# Patient Record
Sex: FEMALE | Race: WHITE | Employment: PART TIME | URBAN - METROPOLITAN AREA
[De-identification: names, ages, dates, MRNs, and addresses within clinical notes are randomized per-mention and may not be internally consistent; named-entity substitution may affect disease eponyms.]

---

## 2018-02-15 ENCOUNTER — HOSPITAL ENCOUNTER (OUTPATIENT)
Age: 33
Discharge: HOME | End: 2018-02-15
Payer: COMMERCIAL

## 2018-02-22 ENCOUNTER — HOSPITAL ENCOUNTER (OUTPATIENT)
Age: 33
Discharge: HOME | End: 2018-02-22
Payer: COMMERCIAL

## 2018-02-22 PROCEDURE — 86003 ALLG SPEC IGE CRUDE XTRC EA: CPT

## 2018-02-22 PROCEDURE — 86008 ALLG SPEC IGE RECOMB EA: CPT

## 2018-02-26 LAB
MISC #3 REFERENCE REPORT: NORMAL
MISC REFERENCE: NORMAL
MISC. #1 REFERENCE GROUP TEST: NORMAL
MISC. #2 REFERENCE REPORT: NORMAL

## 2018-03-23 ENCOUNTER — HOSPITAL ENCOUNTER (OUTPATIENT)
Age: 33
Discharge: HOME | End: 2018-03-23

## 2018-04-23 ENCOUNTER — HOSPITAL ENCOUNTER (EMERGENCY)
Age: 33
Discharge: HOME | End: 2018-04-23
Payer: COMMERCIAL

## 2018-04-23 ENCOUNTER — HOSPITAL ENCOUNTER (EMERGENCY)
Dept: GENERAL RADIOLOGY | Age: 33
Discharge: HOME | End: 2018-04-23
Payer: COMMERCIAL

## 2018-04-23 VITALS
OXYGEN SATURATION: 100 % | SYSTOLIC BLOOD PRESSURE: 114 MMHG | WEIGHT: 150 LBS | DIASTOLIC BLOOD PRESSURE: 71 MMHG | RESPIRATION RATE: 16 BRPM | TEMPERATURE: 98.7 F | HEART RATE: 93 BPM | BODY MASS INDEX: 27.44 KG/M2

## 2018-04-23 DIAGNOSIS — S62.619A: Primary | ICD-10-CM

## 2018-04-23 PROCEDURE — 99214 OFFICE O/P EST MOD 30 MIN: CPT | Performed by: NURSE PRACTITIONER

## 2018-04-23 PROCEDURE — 29130 APPL FINGER SPLINT STATIC: CPT | Performed by: NURSE PRACTITIONER

## 2018-04-23 PROCEDURE — 73130 X-RAY EXAM OF HAND: CPT

## 2018-04-23 PROCEDURE — 99215 OFFICE O/P EST HI 40 MIN: CPT

## 2018-04-23 PROCEDURE — 29130 APPL FINGER SPLINT STATIC: CPT

## 2018-04-23 RX ORDER — MONTELUKAST SODIUM 10 MG/1
10 TABLET ORAL DAILY
COMMUNITY

## 2018-04-23 RX ORDER — HYDROCODONE BITARTRATE AND ACETAMINOPHEN 5; 325 MG/1; MG/1
1 TABLET ORAL EVERY 6 HOURS PRN
Qty: 12 TABLET | Refills: 0 | Status: SHIPPED | OUTPATIENT
Start: 2018-04-23 | End: 2018-04-26

## 2018-04-23 ASSESSMENT — PAIN SCALES - GENERAL: PAINLEVEL_OUTOF10: 1

## 2018-04-23 ASSESSMENT — PAIN DESCRIPTION - LOCATION: LOCATION: FINGER (COMMENT WHICH ONE)

## 2018-04-23 ASSESSMENT — PAIN DESCRIPTION - DESCRIPTORS: DESCRIPTORS: ACHING

## 2018-04-23 ASSESSMENT — PAIN DESCRIPTION - PAIN TYPE: TYPE: ACUTE PAIN

## 2018-04-23 ASSESSMENT — ENCOUNTER SYMPTOMS: COLOR CHANGE: 1

## 2018-04-23 ASSESSMENT — PAIN DESCRIPTION - ORIENTATION: ORIENTATION: RIGHT

## 2018-05-22 ENCOUNTER — HOSPITAL ENCOUNTER (OUTPATIENT)
Age: 33
Discharge: HOME | End: 2018-05-22

## 2018-06-19 ENCOUNTER — HOSPITAL ENCOUNTER (OUTPATIENT)
Age: 33
Discharge: HOME | End: 2018-06-19

## 2018-06-19 ENCOUNTER — HOSPITAL ENCOUNTER (OUTPATIENT)
Dept: GENERAL RADIOLOGY | Age: 33
Discharge: HOME | End: 2018-06-19

## 2018-06-19 DIAGNOSIS — S19.9XXA INJURY OF NECK, INITIAL ENCOUNTER: ICD-10-CM

## 2018-08-21 ENCOUNTER — HOSPITAL ENCOUNTER (OUTPATIENT)
Age: 33
Discharge: HOME | End: 2018-08-21

## 2018-08-21 LAB — HEPATITIS C ANTIBODY: NEGATIVE

## 2018-08-24 LAB — HIV-2 AB: NEGATIVE

## 2019-04-18 ENCOUNTER — HOSPITAL ENCOUNTER (EMERGENCY)
Age: 34
Discharge: HOME | End: 2019-04-18
Payer: COMMERCIAL

## 2019-04-18 VITALS
HEART RATE: 114 BPM | SYSTOLIC BLOOD PRESSURE: 117 MMHG | TEMPERATURE: 98.6 F | BODY MASS INDEX: 29.26 KG/M2 | RESPIRATION RATE: 16 BRPM | DIASTOLIC BLOOD PRESSURE: 65 MMHG | WEIGHT: 159 LBS | HEIGHT: 62 IN | OXYGEN SATURATION: 98 %

## 2019-04-18 DIAGNOSIS — J10.1 INFLUENZA B: Primary | ICD-10-CM

## 2019-04-18 LAB
FLU A ANTIGEN: NEGATIVE
GROUP A STREP CULTURE, REFLEX: NEGATIVE
INFLUENZA B AG, EIA: POSITIVE
REFLEX THROAT C + S: NORMAL

## 2019-04-18 PROCEDURE — 87804 INFLUENZA ASSAY W/OPTIC: CPT

## 2019-04-18 PROCEDURE — 99213 OFFICE O/P EST LOW 20 MIN: CPT

## 2019-04-18 PROCEDURE — 87880 STREP A ASSAY W/OPTIC: CPT

## 2019-04-18 PROCEDURE — 87070 CULTURE OTHR SPECIMN AEROBIC: CPT

## 2019-04-18 PROCEDURE — 99213 OFFICE O/P EST LOW 20 MIN: CPT | Performed by: NURSE PRACTITIONER

## 2019-04-18 RX ORDER — IBUPROFEN 800 MG/1
800 TABLET ORAL EVERY 8 HOURS PRN
Qty: 21 TABLET | Refills: 0 | Status: SHIPPED | OUTPATIENT
Start: 2019-04-18 | End: 2022-04-25

## 2019-04-18 RX ORDER — DEXTROMETHORPHAN HYDROBROMIDE AND PROMETHAZINE HYDROCHLORIDE 15; 6.25 MG/5ML; MG/5ML
5 SYRUP ORAL 4 TIMES DAILY PRN
Qty: 120 ML | Refills: 0 | Status: SHIPPED | OUTPATIENT
Start: 2019-04-18 | End: 2019-04-25

## 2019-04-18 RX ORDER — OSELTAMIVIR PHOSPHATE 75 MG/1
75 CAPSULE ORAL 2 TIMES DAILY
Qty: 10 CAPSULE | Refills: 0 | Status: SHIPPED | OUTPATIENT
Start: 2019-04-18 | End: 2019-04-23

## 2019-04-18 ASSESSMENT — ENCOUNTER SYMPTOMS
SINUS PAIN: 0
SORE THROAT: 1
SINUS PRESSURE: 0
TROUBLE SWALLOWING: 0
RHINORRHEA: 1
NAUSEA: 0
VOMITING: 0
DIARRHEA: 0
COLOR CHANGE: 0
COUGH: 1
SHORTNESS OF BREATH: 0

## 2019-04-18 ASSESSMENT — PAIN DESCRIPTION - PAIN TYPE: TYPE: ACUTE PAIN

## 2019-04-18 ASSESSMENT — PAIN DESCRIPTION - LOCATION: LOCATION: THROAT

## 2019-04-18 ASSESSMENT — PAIN - FUNCTIONAL ASSESSMENT: PAIN_FUNCTIONAL_ASSESSMENT: PREVENTS OR INTERFERES SOME ACTIVE ACTIVITIES AND ADLS

## 2019-04-18 ASSESSMENT — PAIN SCALES - GENERAL: PAINLEVEL_OUTOF10: 5

## 2019-04-18 NOTE — ED TRIAGE NOTES
Patient ambulated to room with complaint of cough, sore throat body ache and fever.  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

## 2019-04-18 NOTE — ED PROVIDER NOTES
[facility]  Urgent Care Encounter       CHIEF COMPLAINT       Chief Complaint   Patient presents with    Cough    Pharyngitis    Fever     102.3 last night       Nurses Notes reviewed and I agree except as noted in the HPI. HISTORY OF PRESENT ILLNESS   Marina Krishnamurthy is a 36 y.o. female who presents to the urgent care for complaints of cough, sore throat, fever. The patient states that her cough started on Monday. Her sore throat started on Tuesday. Her fever started last night. She has been using ibuprofen for her symptoms. 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 She denies nausea, vomiting, diarrhea, chest pain, shortness of breath, wheezing. The patient does have a history of asthma. She states her asthma is well-controlled. HPI    REVIEW OF SYSTEMS     Review of Systems   Constitutional: Positive for activity change, chills, fatigue and fever. Negative for appetite change. HENT: Positive for congestion, postnasal drip, rhinorrhea and sore throat. Negative for sinus pressure, sinus pain and trouble swallowing. Respiratory: Positive for cough. Negative for shortness of breath. Cardiovascular: Negative for chest pain. Gastrointestinal: Negative for diarrhea, nausea and vomiting. Musculoskeletal: Positive for myalgias. Negative for arthralgias. Skin: Negative for color change, pallor, rash and wound. PAST MEDICAL HISTORY         Diagnosis Date    Asthma     Migraines        SURGICALHISTORY     Patient  has a past surgical history that includes Dilation and curettage of uterus; Finger fracture surgery; and Tongue surgery. CURRENT MEDICATIONS       Discharge Medication List as of 4/18/2019  9:20 AM      CONTINUE these medications which have NOT CHANGED    Details   montelukast (SINGULAIR) 10 MG tablet Take 10 mg by mouth dailyHistorical Med      levocetirizine (XYZAL) 5 MG tablet Take 5 mg by mouth nightly.       Mometasone Furo-Formoterol Fum (DULERA IN) Inhale  into the lungs. albuterol (PROVENTIL HFA;VENTOLIN HFA) 108 (90 BASE) MCG/ACT inhaler Inhale 2 puffs into the lungs every 6 hours as needed for Wheezing. fluticasone (FLONASE) 50 MCG/ACT nasal spray 1 spray by Nasal route 2 times daily. NONFORMULARY q nasal spray             ALLERGIES     Patient is has No Known Allergies. Patients   Immunization History   Administered Date(s) Administered    Influenza Virus Vaccine 10/02/2018       FAMILY HISTORY     Patient's family history includes High Cholesterol in her father and mother. SOCIAL HISTORY     Patient  reports that she has never smoked. She has never used smokeless tobacco. She reports that she does not drink alcohol or use drugs. PHYSICAL EXAM     ED TRIAGE VITALS  BP: 117/65, Temp: 98.6 °F (37 °C), Pulse: 114, Resp: 16, SpO2: 98 %,Estimated body mass index is 29.08 kg/m² as calculated from the following:    Height as of this encounter: 5' 2\" (1.575 m). Weight as of this encounter: 159 lb (72.1 kg). ,Patient's last menstrual period was 04/17/2019. Physical Exam   Constitutional: She is oriented to person, place, and time. Vital signs are normal. She appears well-developed and well-nourished. She is active and cooperative. Non-toxic appearance. She does not have a sickly appearance. She appears ill. No distress. HENT:   Head: Normocephalic and atraumatic. Right Ear: Hearing, external ear and ear canal normal. A middle ear effusion is present. Left Ear: Hearing, external ear and ear canal normal. A middle ear effusion is present. Nose: Nose normal.   Mouth/Throat: Uvula is midline and mucous membranes are normal. Posterior oropharyngeal erythema present. No oropharyngeal exudate, posterior oropharyngeal edema or tonsillar abscesses. No tonsillar exudate. Cardiovascular: Normal rate.    Pulmonary/Chest: Effort normal and breath sounds normal.   Neurological: She is alert and oriented to person, place, and time. Skin: Skin is warm and dry. No rash noted. Nursing note and vitals reviewed. DIAGNOSTIC RESULTS     Labs:  Results for orders placed or performed during the hospital encounter of 04/18/19   Rapid influenza A/B antigens   Result Value Ref Range    Flu A Antigen NEGATIVE NEGATIVE    Influenza B Ag, EIA POSITIVE (A) NEGATIVE   STREP A ANTIGEN   Result Value Ref Range    GROUP A STREP CULTURE, REFLEX NEGATIVE        IMAGING:    No orders to display         EKG:      URGENT CARE COURSE:     Vitals:    04/18/19 0838   BP: 117/65   Pulse: 114   Resp: 16   Temp: 98.6 °F (37 °C)   TempSrc: Temporal   SpO2: 98%   Weight: 159 lb (72.1 kg)   Height: 5' 2\" (1.575 m)       Medications - No data to display         PROCEDURES:  None    FINAL IMPRESSION      1. Influenza B          DISPOSITION/ PLAN     The patient's physical exam and laboratory studies are consistent with influenza B. She will be treated with Tamiflu, promethazine DM, ibuprofen. The patient is to take the medication as prescribed/directed. The patient is to follow-up with their PCP in 2-3 days. They are to go to the ER for any worsening symptoms. The patient/caregiver were agreeable to this plan of care and voiced no concerns at time of discharge. The patient was ambulatory out of the department in stable condition. PATIENT REF[facility] TO:  [facility] [address] [place] [id number]      DISCHARGE MEDICATIONS:  Discharge Medication List as of 4/18/2019  9:20 AM      START taking these medications    Details   oseltamivir (TAMIFLU) 75 MG capsule Take 1 capsule by mouth 2 times daily for 5 days, Disp-10 capsule, R-0Normal      promethazine-dextromethorphan (PROMETHAZINE-DM) 6.25-15 MG/5ML syrup Take 5 mLs by mouth 4 times daily as needed for Cough, Disp-120 mL, R-0Normal      ibuprofen (ADVIL;MOTRIN) 800 MG tablet Take 1 tablet by mouth every 8 hours as needed for Pain or Fever, Disp-21 tablet, R-0Normal Discharge Medication List as of 4/18/2019  9:20 AM          Discharge Medication List as of 4/18/2019  9:20 AM          Aris [place], APRN - CNP    (Please note that portions of this note were completed with a voice recognition program. Efforts were made to edit the dictations but occasionally words are mis-transcribed.)           LALIT Monterroso CNP  04/18/19 1012

## 2019-04-18 NOTE — ED NOTES
Pt verbalized discharge instructions. Pt informed to go to ER if develop chest pain, shortness of breath or abdominal pain. Pt ambulatory out in stable condition. Assessment unchanged.        Jordan Cisse RN  04/18/19 [id number]

## 2019-04-21 LAB — THROAT/NOSE CULTURE: NORMAL

## 2020-01-23 ENCOUNTER — OFFICE VISIT (OUTPATIENT)
Dept: FAMILY MEDICINE CLINIC | Age: 35
End: 2020-01-23
Payer: COMMERCIAL

## 2020-01-23 VITALS
SYSTOLIC BLOOD PRESSURE: 122 MMHG | BODY MASS INDEX: 30.69 KG/M2 | OXYGEN SATURATION: 98 % | RESPIRATION RATE: 14 BRPM | HEIGHT: 62 IN | HEART RATE: 96 BPM | WEIGHT: 166.8 LBS | DIASTOLIC BLOOD PRESSURE: 68 MMHG

## 2020-01-23 PROBLEM — L20.9 ATOPIC ECZEMA: Status: ACTIVE | Noted: 2020-01-23

## 2020-01-23 PROBLEM — Z82.79 FAMILY HISTORY OF BICUSPID AORTIC VALVE: Status: ACTIVE | Noted: 2020-01-23

## 2020-01-23 PROBLEM — R20.2 NUMBNESS AND TINGLING IN LEFT HAND: Chronic | Status: ACTIVE | Noted: 2020-01-23

## 2020-01-23 PROBLEM — J45.30 MILD PERSISTENT ASTHMA WITHOUT COMPLICATION: Status: ACTIVE | Noted: 2020-01-23

## 2020-01-23 PROBLEM — R20.0 NUMBNESS AND TINGLING IN LEFT HAND: Chronic | Status: ACTIVE | Noted: 2020-01-23

## 2020-01-23 PROBLEM — G43.109 MIGRAINE WITH AURA AND WITHOUT STATUS MIGRAINOSUS: Status: ACTIVE | Noted: 2020-01-23

## 2020-01-23 LAB
ALBUMIN SERPL-MCNC: 4.7 G/DL (ref 3.5–5.1)
ALP BLD-CCNC: 58 U/L (ref 38–126)
ALT SERPL-CCNC: 8 U/L (ref 11–66)
ANION GAP SERPL CALCULATED.3IONS-SCNC: 14 MEQ/L (ref 8–16)
AST SERPL-CCNC: 15 U/L (ref 5–40)
BASOPHILS # BLD: 0.6 %
BASOPHILS ABSOLUTE: 0 THOU/MM3 (ref 0–0.1)
BILIRUB SERPL-MCNC: 0.7 MG/DL (ref 0.3–1.2)
BUN BLDV-MCNC: 13 MG/DL (ref 7–22)
C-REACTIVE PROTEIN: 0.42 MG/DL (ref 0–1)
CALCIUM SERPL-MCNC: 9.3 MG/DL (ref 8.5–10.5)
CHLORIDE BLD-SCNC: 105 MEQ/L (ref 98–111)
CHOLESTEROL, TOTAL: 191 MG/DL (ref 100–199)
CO2: 24 MEQ/L (ref 23–33)
CREAT SERPL-MCNC: 0.7 MG/DL (ref 0.4–1.2)
EOSINOPHIL # BLD: 0.8 %
EOSINOPHILS ABSOLUTE: 0 THOU/MM3 (ref 0–0.4)
ERYTHROCYTE [DISTWIDTH] IN BLOOD BY AUTOMATED COUNT: 11.7 % (ref 11.5–14.5)
ERYTHROCYTE [DISTWIDTH] IN BLOOD BY AUTOMATED COUNT: 38.7 FL (ref 35–45)
FOLATE: > 20 NG/ML (ref 4.8–24.2)
GFR SERPL CREATININE-BSD FRML MDRD: > 90 ML/MIN/1.73M2
GLUCOSE BLD-MCNC: 96 MG/DL (ref 70–108)
HCT VFR BLD CALC: 41.3 % (ref 37–47)
HDLC SERPL-MCNC: 68 MG/DL
HEMOGLOBIN: 13.4 GM/DL (ref 12–16)
IMMATURE GRANS (ABS): 0.01 THOU/MM3 (ref 0–0.07)
IMMATURE GRANULOCYTES: 0.2 %
LDL CHOLESTEROL CALCULATED: 97 MG/DL
LYMPHOCYTES # BLD: 32.9 %
LYMPHOCYTES ABSOLUTE: 1.7 THOU/MM3 (ref 1–4.8)
MCH RBC QN AUTO: 29.6 PG (ref 26–33)
MCHC RBC AUTO-ENTMCNC: 32.4 GM/DL (ref 32.2–35.5)
MCV RBC AUTO: 91.2 FL (ref 81–99)
MONOCYTES # BLD: 5.6 %
MONOCYTES ABSOLUTE: 0.3 THOU/MM3 (ref 0.4–1.3)
NUCLEATED RED BLOOD CELLS: 0 /100 WBC
PLATELET # BLD: 284 THOU/MM3 (ref 130–400)
PMV BLD AUTO: 9.8 FL (ref 9.4–12.4)
POTASSIUM SERPL-SCNC: 4 MEQ/L (ref 3.5–5.2)
RBC # BLD: 4.53 MILL/MM3 (ref 4.2–5.4)
RHEUMATOID FACTOR: 11 IU/ML (ref 0–13)
SEDIMENTATION RATE, ERYTHROCYTE: 9 MM/HR (ref 0–20)
SEG NEUTROPHILS: 59.9 %
SEGMENTED NEUTROPHILS ABSOLUTE COUNT: 3.1 THOU/MM3 (ref 1.8–7.7)
SODIUM BLD-SCNC: 143 MEQ/L (ref 135–145)
TOTAL PROTEIN: 7.2 G/DL (ref 6.1–8)
TRIGL SERPL-MCNC: 130 MG/DL (ref 0–199)
TSH SERPL DL<=0.05 MIU/L-ACNC: 1.23 UIU/ML (ref 0.4–4.2)
VITAMIN B-12: 549 PG/ML (ref 211–911)
VITAMIN D 25-HYDROXY: 27 NG/ML (ref 30–100)
WBC # BLD: 5.2 THOU/MM3 (ref 4.8–10.8)

## 2020-01-23 PROCEDURE — 36415 COLL VENOUS BLD VENIPUNCTURE: CPT | Performed by: FAMILY MEDICINE

## 2020-01-23 PROCEDURE — 99204 OFFICE O/P NEW MOD 45 MIN: CPT | Performed by: FAMILY MEDICINE

## 2020-01-23 RX ORDER — BUDESONIDE AND FORMOTEROL FUMARATE DIHYDRATE 160; 4.5 UG/1; UG/1
2 AEROSOL RESPIRATORY (INHALATION) 2 TIMES DAILY
COMMUNITY

## 2020-01-23 ASSESSMENT — ENCOUNTER SYMPTOMS
COUGH: 0
ABDOMINAL PAIN: 0
DIARRHEA: 0
CONSTIPATION: 0
VOMITING: 0
NAUSEA: 0
BLOOD IN STOOL: 0
EYE PAIN: 0
SHORTNESS OF BREATH: 0
TROUBLE SWALLOWING: 0

## 2020-01-23 ASSESSMENT — PATIENT HEALTH QUESTIONNAIRE - PHQ9
SUM OF ALL RESPONSES TO PHQ QUESTIONS 1-9: 0
SUM OF ALL RESPONSES TO PHQ9 QUESTIONS 1 & 2: 0
SUM OF ALL RESPONSES TO PHQ QUESTIONS 1-9: 0
2. FEELING DOWN, DEPRESSED OR HOPELESS: 0
1. LITTLE INTEREST OR PLEASURE IN DOING THINGS: 0

## 2020-01-23 NOTE — PROGRESS NOTES
Migraine with aura and without status migrainosus    Mild persistent asthma without complication    Family history of bicuspid aortic valve    Numbness and tingling in left hand    Atopic eczema       The patient is allergic to adhesive tape. Medical History  [address] has a past medical history of Asthma, History of chicken pox, Migraines, and Numbness and tingling in left hand. Past SurgicalHistory  The patient  has a past surgical history that includes Dilation and curettage of uterus; Finger fracture surgery; and Tongue surgery. Family History  This patient's family history includes Anxiety Disorder in her maternal aunt, maternal uncle, and mother; Asthma in her daughter and son; High Cholesterol in her father and mother; Irritable Bowel Syndrome in her son; Other in her maternal grandmother, maternal uncle, mother, son, and son; Parkinsonism in her paternal grandfather; Prostate Cancer in her maternal grandfather; Rheum Arthritis in her sister. Social History  Elly  reports that she has never smoked. She has never used smokeless tobacco. She reports that she does not drink alcohol or use drugs. Medications    Current Outpatient Medications:     Triamcinolone Acetonide (NASACORT ALLERGY 24HR NA), by Nasal route, Disp: , Rfl:     budesonide-formoterol (SYMBICORT) 160-4.5 MCG/ACT AERO, Inhale 2 puffs into the lungs 2 times daily, Disp: , Rfl:     Multiple Vitamin (MULTI-VITAMIN DAILY PO), Take by mouth, Disp: , Rfl:     hydrocortisone 2.5 % cream, Apply topically 2 times daily. , Disp: 1 Tube, Rfl: 2    ibuprofen (ADVIL;MOTRIN) 800 MG tablet, Take 1 tablet by mouth every 8 hours as needed for Pain or Fever, Disp: 21 tablet, Rfl: 0    montelukast (SINGULAIR) 10 MG tablet, Take 10 mg by mouth daily, Disp: , Rfl:     levocetirizine (XYZAL) 5 MG tablet, Take 5 mg by mouth nightly., Disp: , Rfl:     albuterol (PROVENTIL HFA;VENTOLIN HFA) 108 (90 BASE) MCG/ACT inhaler, Inhale 2 puffs into the up labs in 4 weeks. - Rheumatoid Factor; Future  - Cyclic Citrul Peptide Antibody, IgG; Future  - MODESTA Screen With Reflex; Future  - C-Reactive Protein; Future  - Sedimentation Rate; Future      5. Fatigue, unspecified type  Will check labs for fatigue  Benign exam  - TSH with Reflex; Future  - CBC With Auto Differential; Future  - Comprehensive Metabolic Panel; Future  - Lipid Panel; Future  - Vitamin B12 & Folate; Future  - Vitamin D 25 Hydroxy; Future      6. Atopic dermatitis, unspecified type  Not currently flaring   Will give patient steroid cream to keep at home. - hydrocortisone 2.5 % cream; Apply topically 2 times daily. Dispense: 1 Tube; Refill: 2        Return in about 4 weeks (around 2/20/2020).     Orders Placed   Orders Placed This Encounter   Procedures    Rheumatoid Factor     Standing Status:   Future     Number of Occurrences:   1     Standing Expiration Date:   5/91/7000    Cyclic Citrul Peptide Antibody, IgG     Standing Status:   Future     Number of Occurrences:   1     Standing Expiration Date:   1/23/2021    TSH with Reflex     Standing Status:   Future     Number of Occurrences:   1     Standing Expiration Date:   1/23/2021    MODESTA Screen With Reflex     Standing Status:   Future     Number of Occurrences:   1     Standing Expiration Date:   1/23/2021    C-Reactive Protein     Standing Status:   Future     Number of Occurrences:   1     Standing Expiration Date:   1/23/2021    Sedimentation Rate     Standing Status:   Future     Number of Occurrences:   1     Standing Expiration Date:   1/23/2021    CBC With Auto Differential     Standing Status:   Future     Number of Occurrences:   1     Standing Expiration Date:   1/23/2021    Comprehensive Metabolic Panel     Standing Status:   Future     Number of Occurrences:   1     Standing Expiration Date:   1/23/2021    Lipid Panel     Standing Status:   Future     Number of Occurrences:   1     Standing Expiration Date:   1/23/2021

## 2020-01-26 LAB — ANA SCREEN: NORMAL

## 2020-01-27 LAB — CYCLIC CITRULLINATED PEPTIDE ANTIBODY IGG: < 1.5 U/ML

## 2020-02-05 ENCOUNTER — TELEPHONE (OUTPATIENT)
Dept: FAMILY MEDICINE CLINIC | Age: 35
End: 2020-02-05

## 2020-02-12 ENCOUNTER — HOSPITAL ENCOUNTER (OUTPATIENT)
Dept: NON INVASIVE DIAGNOSTICS | Age: 35
Discharge: HOME | End: 2020-02-12
Payer: COMMERCIAL

## 2020-02-12 LAB
LV EF: 60 %
LVEF MODALITY: NORMAL

## 2020-02-12 PROCEDURE — 93306 TTE W/DOPPLER COMPLETE: CPT

## 2020-02-27 ENCOUNTER — OFFICE VISIT (OUTPATIENT)
Dept: FAMILY MEDICINE CLINIC | Age: 35
End: 2020-02-27
Payer: COMMERCIAL

## 2020-02-27 ENCOUNTER — TELEPHONE (OUTPATIENT)
Dept: FAMILY MEDICINE CLINIC | Age: 35
End: 2020-02-27

## 2020-02-27 VITALS
HEIGHT: 62 IN | RESPIRATION RATE: 16 BRPM | HEART RATE: 86 BPM | WEIGHT: 166 LBS | BODY MASS INDEX: 30.55 KG/M2 | OXYGEN SATURATION: 99 % | DIASTOLIC BLOOD PRESSURE: 68 MMHG | SYSTOLIC BLOOD PRESSURE: 106 MMHG

## 2020-02-27 PROCEDURE — 99214 OFFICE O/P EST MOD 30 MIN: CPT | Performed by: FAMILY MEDICINE

## 2020-02-27 RX ORDER — SUMATRIPTAN 50 MG/1
50 TABLET, FILM COATED ORAL DAILY PRN
Qty: 18 TABLET | Refills: 1 | Status: SHIPPED | OUTPATIENT
Start: 2020-02-27 | End: 2020-04-27 | Stop reason: SDUPTHER

## 2020-02-27 ASSESSMENT — ENCOUNTER SYMPTOMS
SHORTNESS OF BREATH: 0
BLOOD IN STOOL: 0
COUGH: 0
TROUBLE SWALLOWING: 0
ABDOMINAL PAIN: 0
NAUSEA: 0
DIARRHEA: 0
EYE PAIN: 0
CONSTIPATION: 0
VOMITING: 0

## 2020-02-27 NOTE — PROGRESS NOTES
screening. Menstrual periods are mostly regular, may shift a few days each month. . 3 vaginal deliveries. 2 D&C. 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 Has a partner, 3 kids ages, 15, 15, and 6. Diet- generally healthy. No exercise plan currently. Last cervical cancer screen was around 3 years ago. Follows with Ob-GYN of [facility]. Has had LEEP procedure in the past but states that all Paps since then have been normal.     Patient Active Problem List   Diagnosis    Migraine with aura and without status migrainosus    Mild persistent asthma without complication    Family history of bicuspid aortic valve    Numbness and tingling in left hand    Atopic eczema       The patient is allergic to adhesive tape. Medical History  Julito Onofre has a past medical history of Asthma, History of chicken pox, Migraines, and Numbness and tingling in left hand. Past SurgicalHistory  The patient  has a past surgical history that includes Dilation and curettage of uterus; Finger fracture surgery; and Tongue surgery. Family History  This patient's family history includes Anxiety Disorder in her maternal aunt, maternal uncle, and mother; Asthma in her daughter and son; High Cholesterol in her father and mother; Irritable Bowel Syndrome in her son; Other in her maternal grandmother, maternal uncle, mother, son, and son; Parkinsonism in her paternal grandfather; Prostate Cancer in her maternal grandfather; Rheum Arthritis in her sister. Social History  Elly  reports that she has never smoked. She has never used smokeless tobacco. She reports that she does not drink alcohol or use drugs. Medications    Current Outpatient Medications:     SUMAtriptan (IMITREX) 50 MG tablet, Take 1 tablet by mouth daily as needed for Migraine May repeat in 2 hours if needed. Max dose 200 mg in 24 hours. , Disp: 18 tablet, Rfl: 1    Triamcinolone Acetonide (NASACORT 105 01/23/2020    CREATININE 0.7 01/23/2020    BUN 13 01/23/2020    CO2 24 01/23/2020    TSH 1.230 01/23/2020       Elizabeth Specter:   1. Migraine with aura and without status migrainosus, not intractable  Stable overall but bothersome. Will trial imitrex. If needing frequently would add back topamax. Aware of need to D/C if pregnancy.    - SUMAtriptan (IMITREX) 50 MG tablet; Take 1 tablet by mouth daily as needed for Migraine May repeat in 2 hours if needed. Max dose 200 mg in 24 hours. Dispense: 18 tablet; Refill: 1    2. Family history of bicuspid aortic valve  Unremarkable echo. No action needed. Indications for prompt return if symptomatic reviewed in detail. 3. Mild persistent asthma without complication  Stable. 4. Hand pain, right  Mild; normal labs. Suspect OA. Continue NSAID and tylenol prn.      5. Fatigue, unspecified type  Mild. Suspect shift work as cause. Normal labs and exam. Will follow clinically. Return in about 2 months (around 4/27/2020) for Follow-up chronic conditions. Orders Placed   No orders of the defined types were placed in this encounter. Prescriptions given/sent  Orders Placed This Encounter   Medications    SUMAtriptan (IMITREX) 50 MG tablet     Sig: Take 1 tablet by mouth daily as needed for Migraine May repeat in 2 hours if needed. Max dose 200 mg in 24 hours. Dispense:  18 tablet     Refill:  1       Patient given educational materials - see patient instructions. Discussed use, benefit, and side effects of prescribed medications. All patientquestions answered. Pt voiced understanding. Reviewed health maintenance.               Electronically signed by Leida López MD on 2/27/2020 at 1:44 PM

## 2020-03-30 ENCOUNTER — TELEPHONE (OUTPATIENT)
Dept: FAMILY MEDICINE CLINIC | Age: 35
End: 2020-03-30

## 2020-04-14 ENCOUNTER — TELEPHONE (OUTPATIENT)
Dept: FAMILY MEDICINE CLINIC | Age: 35
End: 2020-04-14

## 2020-04-27 ENCOUNTER — VIRTUAL VISIT (OUTPATIENT)
Dept: FAMILY MEDICINE CLINIC | Age: 35
End: 2020-04-27
Payer: COMMERCIAL

## 2020-04-27 PROCEDURE — 99213 OFFICE O/P EST LOW 20 MIN: CPT | Performed by: FAMILY MEDICINE

## 2020-04-27 RX ORDER — SUMATRIPTAN 50 MG/1
50 TABLET, FILM COATED ORAL DAILY PRN
Qty: 18 TABLET | Refills: 1 | Status: SHIPPED | OUTPATIENT
Start: 2020-04-27 | End: 2020-10-26 | Stop reason: SDUPTHER

## 2020-04-27 ASSESSMENT — ENCOUNTER SYMPTOMS
EYE DISCHARGE: 0
VOMITING: 0
SHORTNESS OF BREATH: 0
BACK PAIN: 0
NAUSEA: 0
CONSTIPATION: 0
SORE THROAT: 0
DIARRHEA: 0
ABDOMINAL PAIN: 0
COUGH: 0
EYE REDNESS: 0

## 2020-04-27 NOTE — PATIENT INSTRUCTIONS
Patient Education        Migraine Headache: Care Instructions  Your Care Instructions  Migraines are painful, throbbing headaches that often start on one side of the head. They may cause nausea and vomiting and make you sensitive to light, sound, or smell. Without treatment, migraines can last from 4 hours to a few days. Medicines can help prevent migraines or stop them after they have started. Your doctor can help you find which ones work best for you. Follow-up care is a key part of your treatment and safety. Be sure to make and go to all appointments, and call your doctor if you are having problems. It's also a good idea to know your test results and keep a list of the medicines you take. How can you care for yourself at home? · Do not drive if you have taken a prescription pain medicine. · Rest in a quiet, dark room until your headache is gone. Close your eyes, and try to relax or go to sleep. Don't watch TV or read. · Put a cold, moist cloth or cold pack on the painful area for 10 to 20 minutes at a time. Put a thin cloth between the cold pack and your skin. · Use a warm, moist towel or a heating pad set on low to relax tight shoulder and neck muscles. · Have someone gently massage your neck and shoulders. · Take your medicines exactly as prescribed. Call your doctor if you think you are having a problem with your medicine. You will get more details on the specific medicines your doctor prescribes. · Be careful not to take pain medicine more often than the instructions allow. You could get worse or more frequent headaches when the medicine wears off. To prevent migraines  · Keep a headache diary so you can figure out what triggers your headaches. Avoiding triggers may help you prevent headaches. Record when each headache began, how long it lasted, and what the pain was like.  (Was it throbbing, aching, stabbing, or dull?) Write down any other symptoms you had with the headache, such as nausea, flashing lights or dark spots, or sensitivity to bright light or loud noise. Note if the headache occurred near your period. List anything that might have triggered the headache. Triggers may include certain foods (chocolate, cheese, wine) or odors, smoke, bright light, stress, or lack of sleep. · If your doctor has prescribed medicine for your migraines, take it as directed. You may have medicine that you take only when you get a migraine and medicine that you take all the time to help prevent migraines. ? If your doctor has prescribed medicine for when you get a headache, take it at the first sign of a migraine, unless your doctor has given you other instructions. ? If your doctor has prescribed medicine to prevent migraines, take it exactly as prescribed. Call your doctor if you think you are having a problem with your medicine. · Find healthy ways to deal with stress. Migraines are most common during or right after stressful times. Take time to relax before and after you do something that has caused a migraine in the past.  · Try to keep your muscles relaxed by keeping good posture. Check your jaw, face, neck, and shoulder muscles for tension. Try to relax them. When you sit at a desk, change positions often. And make sure to stretch for 30 seconds each hour. · Get plenty of sleep and exercise. · Eat meals on a regular schedule. Avoid foods and drinks that often trigger migraines. These include chocolate, alcohol (especially red wine and port), aspartame, monosodium glutamate (MSG), and some additives found in foods (such as hot dogs, clarke, cold cuts, aged cheeses, and pickled foods). · Limit caffeine. Don't drink too much coffee, tea, or soda. But don't quit caffeine suddenly. That can also give you migraines. · Do not smoke or allow others to smoke around you. If you need help quitting, talk to your doctor about stop-smoking programs and medicines.  These can increase your chances of quitting for

## 2020-04-27 NOTE — PROGRESS NOTES
in her 25s. Only has to use albuterol if she has a cold or if it is really cold outside, otherwise does not have to use it. Pretty well controlled.      Has migraines a couple of times a month. Has been having these for over 20 years. Saw Dr. Janie Kohli, several years ago and had full workup. Currently doing well on prn Imitrex. Had been on Topamax in the past which helped her. Does not recognize triggers. Starts with headache with aura. States that she sees alberto occasionally before having headache. Most of the time she feels like she is dealing with them pretty well. On occasion takes ibuprofen or tylenol for these headaches, with some relief.      Menstrual periods are mostly regular, may shift a few days each month. . 3 vaginal deliveries. 2 D&C.      Has a partner, 3 kids.     Diet- generally healthy. No exercise plan currently.      Last cervical cancer screen was around 3 years ago. [facility]. Has had LEEP procedure in the past but states that all Paps since then have been normal.   Patient Active Problem List   Diagnosis    Migraine with aura and without status migrainosus    Mild persistent asthma without complication    Family history of bicuspid aortic valve    Numbness and tingling in left hand    Atopic eczema       The patient is allergic to adhesive tape. Medical History  Keira Mcintyre has a past medical history of Asthma, History of chicken pox, Migraines, and Numbness and tingling in left hand. Past SurgicalHistory  The patient  has a past surgical history that includes Dilation and curettage of uterus; Finger fracture surgery; and Tongue surgery. Family History  This patient's family history includes Anxiety Disorder in her maternal aunt, maternal uncle, and mother; Asthma in her daughter and son; High Cholesterol in her father and mother; Irritable Bowel Syndrome in her son;  Other in her maternal grandmother, maternal uncle, mother, son, and son; Parkinsonism in her paternal grandfather; Prostate Cancer in her maternal grandfather; Rheum Arthritis in her sister. Social History  Elly  reports that she has never smoked. She has never used smokeless tobacco. She reports that she does not drink alcohol or use drugs. Medications    Current Outpatient Medications:     SUMAtriptan (IMITREX) 50 MG tablet, Take 1 tablet by mouth daily as needed for Migraine May repeat in 2 hours if needed. Max dose 200 mg in 24 hours. , Disp: 18 tablet, Rfl: 1    Triamcinolone Acetonide (NASACORT ALLERGY 24HR NA), by Nasal route, Disp: , Rfl:     budesonide-formoterol (SYMBICORT) 160-4.5 MCG/ACT AERO, Inhale 2 puffs into the lungs 2 times daily, Disp: , Rfl:     Multiple Vitamin (MULTI-VITAMIN DAILY PO), Take by mouth, Disp: , Rfl:     hydrocortisone 2.5 % cream, Apply topically 2 times daily. , Disp: 1 Tube, Rfl: 2    ibuprofen (ADVIL;MOTRIN) 800 MG tablet, Take 1 tablet by mouth every 8 hours as needed for Pain or Fever, Disp: 21 tablet, Rfl: 0    montelukast (SINGULAIR) 10 MG tablet, Take 10 mg by mouth daily, Disp: , Rfl:     levocetirizine (XYZAL) 5 MG tablet, Take 5 mg by mouth nightly., Disp: , Rfl:     albuterol (PROVENTIL HFA;VENTOLIN HFA) 108 (90 BASE) MCG/ACT inhaler, Inhale 2 puffs into the lungs every 6 hours as needed for Wheezing., Disp: , Rfl:     Subjective:      Review of Systems   Constitutional: Negative for chills, fatigue, fever and unexpected weight change. HENT: Negative for congestion, ear discharge, ear pain, hearing loss and sore throat. Eyes: Negative for discharge and redness. Respiratory: Negative for cough and shortness of breath. Cardiovascular: Negative for chest pain and palpitations. Gastrointestinal: Negative for abdominal pain, constipation, diarrhea, nausea and vomiting. Genitourinary: Negative for difficulty urinating and dysuria. Musculoskeletal: Negative for arthralgias, back pain, gait problem and neck pain.    Skin: Negative for

## 2020-10-26 ENCOUNTER — VIRTUAL VISIT (OUTPATIENT)
Dept: FAMILY MEDICINE CLINIC | Age: 35
End: 2020-10-26
Payer: COMMERCIAL

## 2020-10-26 PROCEDURE — 99213 OFFICE O/P EST LOW 20 MIN: CPT | Performed by: FAMILY MEDICINE

## 2020-10-26 RX ORDER — SUMATRIPTAN 50 MG/1
50 TABLET, FILM COATED ORAL DAILY PRN
Qty: 18 TABLET | Refills: 3 | Status: SHIPPED | OUTPATIENT
Start: 2020-10-26 | End: 2021-04-15 | Stop reason: SDUPTHER

## 2020-10-26 ASSESSMENT — ENCOUNTER SYMPTOMS
DIARRHEA: 0
SHORTNESS OF BREATH: 0
BLURRED VISION: 1
SORE THROAT: 0
VISUAL CHANGE: 1
COUGH: 0
CONSTIPATION: 0
NAUSEA: 1
EYE PAIN: 0
SINUS PRESSURE: 0
ABDOMINAL PAIN: 0

## 2020-10-26 NOTE — PROGRESS NOTES
[id number] [facility] [address] [address]  Dept: [phone]  Dept Fax: [phone]  Loc: [phone]      Soraida Vigil is a 30 y.o. female who presents today for:  Chief Complaint   Patient presents with    1 Month Follow-Up       Goals    None         HPI:     Migraine    This is a chronic problem. The current episode started more than 1 year ago. The problem occurs monthly. The problem has been unchanged. The pain is located in the left unilateral region. The pain does not radiate. The pain quality is similar to prior headaches. The quality of the pain is described as pulsating. The pain is at a severity of 7/10. The pain is moderate. Associated symptoms include blurred vision, nausea and a visual change. Pertinent negatives include no abdominal pain, coughing, eye pain, fever, hearing loss, seizures, sinus pressure, sore throat, tingling or tinnitus. The symptoms are aggravated by unknown. She has tried darkened room, NSAIDs, triptans, beta blockers and Excedrin for the symptoms. The treatment provided significant relief. There is no history of cluster headaches or pseudotumor cerebri. Patient seen today for followup on migraines. Patient says she is taking imitrix 2-3 times a month and takes 1-2 doses. She says her migraines are currently well controlled since seeing us 6 months ago. There was discussion about restarting topamax but she does not think she needs it and is happy with current regimen. No red flag symptoms such as changes in quality/quanity, fever, weakness, neurological changes, or immunosuppresion.       Past Medical History:   Diagnosis Date    Asthma     History of chicken pox     Migraines     Numbness and tingling in left hand     Due to prior injury      Past Surgical History:   Procedure Laterality Date    DILATION AND CURETTAGE OF UTERUS      x2    FINGER FRACTURE SURGERY      TONGUE SURGERY       Family History   Problem Relation Age of Onset    High Cholesterol Mother     Other Mother         cardiomyopathy and atrial septic defect.  Anxiety Disorder Mother     High Cholesterol Father     Rheum Arthritis Sister     Anxiety Disorder Maternal Aunt     Other Maternal Uncle         chrons    Anxiety Disorder Maternal Uncle     Other Maternal Grandmother         bicuspid aortic valve replacement     Prostate Cancer Maternal Grandfather     Parkinsonism Paternal Grandfather     Irritable Bowel Syndrome Son    Honorio Cortez Other Son         bicuspid aortic valve and aortic insufficiency.  Asthma Son     Asthma Daughter     Other Son         autism     Social History     Tobacco Use    Smoking status: Never Smoker    Smokeless tobacco: Never Used   Substance Use Topics    Alcohol use: No      Current Outpatient Medications   Medication Sig Dispense Refill    SUMAtriptan (IMITREX) 50 MG tablet Take 1 tablet by mouth daily as needed for Migraine May repeat in 2 hours if needed. Max dose 200 mg in 24 hours. 18 tablet 3    Triamcinolone Acetonide (NASACORT ALLERGY 24HR NA) by Nasal route      budesonide-formoterol (SYMBICORT) 160-4.5 MCG/ACT AERO Inhale 2 puffs into the lungs 2 times daily      Multiple Vitamin (MULTI-VITAMIN DAILY PO) Take by mouth      hydrocortisone 2.5 % cream Apply topically 2 times daily. 1 Tube 2    ibuprofen (ADVIL;MOTRIN) 800 MG tablet Take 1 tablet by mouth every 8 hours as needed for Pain or Fever 21 tablet 0    montelukast (SINGULAIR) 10 MG tablet Take 10 mg by mouth daily      levocetirizine (XYZAL) 5 MG tablet Take 5 mg by mouth nightly.  albuterol (PROVENTIL HFA;VENTOLIN HFA) 108 (90 BASE) MCG/ACT inhaler Inhale 2 puffs into the lungs every 6 hours as needed for Wheezing. No current facility-administered medications for this visit.       Allergies   Allergen Reactions    Adhesive Tape Swelling and Rash       Health Maintenance   Topic Date Due    Cervical cancer screen  09/28/2019    DTaP/Tdap/Td vaccine (2 - Td) 03/12/2028    Flu vaccine  Completed    Pneumococcal 0-64 years Vaccine  Completed    HIV screen  Completed    Hepatitis A vaccine  Aged Out    Hepatitis B vaccine  Aged Out    Hib vaccine  Aged Out    Meningococcal (ACWY) vaccine  Aged Out    Varicella vaccine  Discontinued       Subjective:      Review of Systems   Constitutional: Negative for appetite change, fever and unexpected weight change. HENT: Negative for congestion, hearing loss, sinus pressure, sore throat and tinnitus. Eyes: Positive for blurred vision. Negative for pain and visual disturbance. Respiratory: Negative for cough and shortness of breath. Cardiovascular: Negative for chest pain and leg swelling. Gastrointestinal: Positive for nausea. Negative for abdominal pain, constipation and diarrhea. Genitourinary: Negative for difficulty urinating, dysuria, hematuria, menstrual problem, pelvic pain, vaginal bleeding, vaginal discharge and vaginal pain. Musculoskeletal: Negative for arthralgias and myalgias. Neurological: Negative for tingling and seizures. Psychiatric/Behavioral: Negative for behavioral problems and sleep disturbance. Objective: There were no vitals filed for this visit. There is no height or weight on file to calculate BMI. Wt Readings from Last 3 Encounters:   02/27/20 166 lb (75.3 kg)   01/23/20 166 lb 12.8 oz (75.7 kg)   04/18/19 159 lb (72.1 kg)     BP Readings from Last 3 Encounters:   02/27/20 106/68   01/23/20 122/68   04/18/19 117/65       Physical Exam  Constitutional:       General: She is not in acute distress. Appearance: Normal appearance. She is not ill-appearing. HENT:      Head: Normocephalic and atraumatic. Right Ear: External ear normal.      Left Ear: External ear normal.      Nose: Nose normal. No congestion or rhinorrhea.       Mouth/Throat:      Mouth: Mucous membranes are moist. Pharynx: Oropharynx is clear. Eyes:      General: No scleral icterus. Right eye: No discharge. Left eye: No discharge. Conjunctiva/sclera: Conjunctivae normal.      Pupils: Pupils are equal, round, and reactive to light. Neck:      Musculoskeletal: Normal range of motion and neck supple. Pulmonary:      Effort: Pulmonary effort is normal. No respiratory distress. Musculoskeletal: Normal range of motion. General: No swelling, deformity or signs of injury. Skin:     General: Skin is warm and dry. Findings: No erythema or rash. Neurological:      General: No focal deficit present. Mental Status: She is alert and oriented to person, place, and time. Mental status is at baseline. Cranial Nerves: No cranial nerve deficit. Psychiatric:         Mood and Affect: Mood normal.         Behavior: Behavior normal.         Judgment: Judgment normal.         Lab Results   Component Value Date    WBC 5.2 01/23/2020    HGB 13.4 01/23/2020    HCT 41.3 01/23/2020     01/23/2020    CHOL 191 01/23/2020    TRIG 130 01/23/2020    HDL 68 01/23/2020    LDLCALC 97 01/23/2020    AST 15 01/23/2020     01/23/2020    K 4.0 01/23/2020     01/23/2020    CREATININE 0.7 01/23/2020    BUN 13 01/23/2020    CO2 24 01/23/2020    TSH 1.230 01/23/2020    LABGLOM >90 01/23/2020    CALCIUM 9.3 01/23/2020    VITD25 27 (L) 01/23/2020       Imaging Results:    No results found. Assessment/Plan:     Pete Sapp was seen today for 1 month follow-up. Diagnoses and all orders for this visit:    Migraine with aura and without status migrainosus, not intractable  -     SUMAtriptan (IMITREX) 50 MG tablet; Take 1 tablet by mouth daily as needed for Migraine May repeat in 2 hours if needed. Max dose 200 mg in 24 hours. Will continue imitrix and defer topamax at this time as migraines currently well controlled. Will followup in 6 months and reassess.              Return in about 6 months (around 4/26/2021) for re-check headaches. Medications Prescribed:  Orders Placed This Encounter   Medications    SUMAtriptan (IMITREX) 50 MG tablet     Sig: Take 1 tablet by mouth daily as needed for Migraine May repeat in 2 hours if needed. Max dose 200 mg in 24 hours. Dispense:  18 tablet     Refill:  3       Future Appointments   Date Time Provider Gala Casandra   4/12/2021 12:30 PM Isela Loo MD [address] [facility] [address]       Patient given educational materials - see patient instructions. Discussed use, benefit, and sideeffects of prescribed medications. All patient questions answered. Pt voiced understanding. Reviewed health maintenance. Instructed to continue current medications, diet and exercise. Patient agreed with treatment plan. Follow up as directed. Attending attestation:  I personally performed and participated key or critical portions of the evaluation and management including personally performing the exam and medical decision making. I verify the accuracy of the documentation by the resident with the following addition or changes: Patient is stable. Headaches are stable with Imitrex use  mg only a couple times a month. Not currently planning pregnancy. Other issues stable.         Electronically signed by Isela Loo MD on 10/26/2020 at 5:08 PM      Electronically signed by Isela Loo MD on 10/26/2020 at 5:08 PM

## 2021-04-15 ENCOUNTER — VIRTUAL VISIT (OUTPATIENT)
Dept: FAMILY MEDICINE CLINIC | Age: 36
End: 2021-04-15
Payer: COMMERCIAL

## 2021-04-15 DIAGNOSIS — G43.109 MIGRAINE WITH AURA AND WITHOUT STATUS MIGRAINOSUS, NOT INTRACTABLE: ICD-10-CM

## 2021-04-15 PROCEDURE — 99213 OFFICE O/P EST LOW 20 MIN: CPT | Performed by: FAMILY MEDICINE

## 2021-04-15 RX ORDER — SUMATRIPTAN 50 MG/1
50 TABLET, FILM COATED ORAL DAILY PRN
Qty: 18 TABLET | Refills: 3 | Status: SHIPPED | OUTPATIENT
Start: 2021-04-15 | End: 2021-10-11 | Stop reason: SDUPTHER

## 2021-04-15 ASSESSMENT — ENCOUNTER SYMPTOMS
NAUSEA: 0
VISUAL CHANGE: 0
RHINORRHEA: 0
EYE PAIN: 0
BACK PAIN: 0
COUGH: 0
BLURRED VISION: 0
VOMITING: 0
PHOTOPHOBIA: 0
SORE THROAT: 0
EYE WATERING: 0
EYE REDNESS: 0

## 2021-04-15 ASSESSMENT — PATIENT HEALTH QUESTIONNAIRE - PHQ9
SUM OF ALL RESPONSES TO PHQ QUESTIONS 1-9: 0
2. FEELING DOWN, DEPRESSED OR HOPELESS: 0
SUM OF ALL RESPONSES TO PHQ9 QUESTIONS 1 & 2: 0

## 2021-04-15 NOTE — PROGRESS NOTES
Holly Cuevas (:  1985) is a 29 y.o. female,Established patient, here for evaluation of the following chief complaint(s): Other (f/u migraines)      ASSESSMENT/PLAN:  1. Migraine with aura and without status migrainosus, not intractable  Patient's migraines are well controlled on sumatriptan. Patient would like to stay on her current medication. We will continue sumatriptan 50 mg as needed for migraines. Patient will follow up in 6 months to reassess her migraines. - SUMAtriptan (IMITREX) 50 MG tablet; Take 1 tablet by mouth daily as needed for Migraine May repeat in 2 hours if needed. Max dose 200 mg in 24 hours. Dispense: 18 tablet; Refill: 3    Return in about 6 months (around 10/15/2021) for f/u migraines. SUBJECTIVE/OBJECTIVE:  Migraine   This is a chronic problem. The problem occurs intermittently. The problem has been gradually improving. The pain is at a severity of 0/10. The patient is experiencing no pain. Pertinent negatives include no back pain, blurred vision, coughing, eye pain, eye redness, eye watering, hearing loss, loss of balance, nausea, numbness, phonophobia, photophobia, rhinorrhea, sore throat, tingling, visual change or vomiting. She has tried triptans for the symptoms. The treatment provided significant relief. Her past medical history is significant for migraine headaches. She is using Imitrex 2-3 times per month, for 1-2 doses. Imitrex is working well for her. The last time she had a migraine was . The migraine lasted for less than one day. She reports no side effects from Imitrex. She is happy with current medications, and does not want to start topamax. Review of Systems   HENT: Negative for hearing loss, rhinorrhea and sore throat. Eyes: Negative for blurred vision, photophobia, pain and redness. Respiratory: Negative for cough. Gastrointestinal: Negative for nausea and vomiting. Musculoskeletal: Negative for back pain.    Neurological:

## 2021-10-11 ENCOUNTER — VIRTUAL VISIT (OUTPATIENT)
Dept: FAMILY MEDICINE CLINIC | Age: 36
End: 2021-10-11
Payer: COMMERCIAL

## 2021-10-11 DIAGNOSIS — G43.109 MIGRAINE WITH AURA AND WITHOUT STATUS MIGRAINOSUS, NOT INTRACTABLE: ICD-10-CM

## 2021-10-11 PROCEDURE — 99213 OFFICE O/P EST LOW 20 MIN: CPT | Performed by: FAMILY MEDICINE

## 2021-10-11 RX ORDER — SUMATRIPTAN 50 MG/1
50 TABLET, FILM COATED ORAL DAILY PRN
Qty: 18 TABLET | Refills: 3 | Status: SHIPPED | OUTPATIENT
Start: 2021-10-11 | End: 2022-04-25 | Stop reason: SDUPTHER

## 2021-10-11 NOTE — PROGRESS NOTES
[address] [address]  Dept: [phone]  Dept Fax: [phone]  Loc: [phone]      Seun Hdez is a 29 y.o. female who presents todayfor her medical conditions/complaints as noted below. Seun Hdez is c/o of No chief complaint on file.      :     HPI     Here for follow-up on migraines. On imitrex. Doing well. Typically worse in Spring. Uses imitrex maybe once a week. Only every other week this month. Usually feels better 1 hour after a dose. No desire to add topamax at this time. No changes desired. Has appointment in November for pap smear. Asthma controlled. Sees allergy immunology for this. Already got flu shot at employee clinic this year. Patient Active Problem List   Diagnosis    Migraine with aura and without status migrainosus    Mild persistent asthma without complication    Family history of bicuspid aortic valve    Numbness and tingling in left hand    Atopic eczema     Goals    None       The patient is allergic to adhesive tape. Medical History  Clarissa Oneal has a past medical history of Asthma, History of chicken pox, Migraines, and Numbness and tingling in left hand. Past SurgicalHistory  The patient  has a past surgical history that includes Dilation and curettage of uterus; Finger fracture surgery; and Tongue surgery. Family History  This patient's family history includes Anxiety Disorder in her maternal aunt, maternal uncle, and mother; Asthma in her daughter and son; High Cholesterol in her father and mother; Irritable Bowel Syndrome in her son; Other in her maternal grandmother, maternal uncle, mother, son, and son; Parkinsonism in her paternal grandfather; Prostate Cancer in her maternal grandfather; Rheum Arthritis in her sister. Social History  Elly  reports that she has never smoked.  She has never used smokeless tobacco. She reports that she does not drink alcohol and does not use drugs. Medications    Current Outpatient Medications:     SUMAtriptan (IMITREX) 50 MG tablet, Take 1 tablet by mouth daily as needed for Migraine May repeat in 2 hours if needed. Max dose 200 mg in 24 hours. , Disp: 18 tablet, Rfl: 3    Triamcinolone Acetonide (NASACORT ALLERGY 24HR NA), by Nasal route, Disp: , Rfl:     budesonide-formoterol (SYMBICORT) 160-4.5 MCG/ACT AERO, Inhale 2 puffs into the lungs 2 times daily, Disp: , Rfl:     Multiple Vitamin (MULTI-VITAMIN DAILY PO), Take by mouth, Disp: , Rfl:     hydrocortisone 2.5 % cream, Apply topically 2 times daily. , Disp: 1 Tube, Rfl: 2    ibuprofen (ADVIL;MOTRIN) 800 MG tablet, Take 1 tablet by mouth every 8 hours as needed for Pain or Fever, Disp: 21 tablet, Rfl: 0    montelukast (SINGULAIR) 10 MG tablet, Take 10 mg by mouth daily, Disp: , Rfl:     levocetirizine (XYZAL) 5 MG tablet, Take 5 mg by mouth nightly., Disp: , Rfl:     albuterol (PROVENTIL HFA;VENTOLIN HFA) 108 (90 BASE) MCG/ACT inhaler, Inhale 2 puffs into the lungs every 6 hours as needed for Wheezing., Disp: , Rfl:     Subjective:      Review of Systems     Occasional headache. Feels well otherwise. No other symptoms reported. No red flags. Breathing stable on medications. Allergies stable. Objective: There were no vitals filed for this visit. Physical Exam  Constitutional:       General: She is not in acute distress. Appearance: Normal appearance. She is not ill-appearing or toxic-appearing. HENT:      Head: Normocephalic and atraumatic. Nose: Nose normal.      Mouth/Throat:      Mouth: Mucous membranes are moist.   Eyes:      Conjunctiva/sclera: Conjunctivae normal.      Pupils: Pupils are equal, round, and reactive to light. Pulmonary:      Effort: Pulmonary effort is normal. No respiratory distress. Skin:     General: Skin is dry. Findings: No rash. Neurological:      Mental Status: She is alert. Psychiatric:         Mood and Affect: Mood normal.         Behavior: Behavior normal.         Thought Content: Thought content normal.         Lab Results   Component Value Date    WBC 5.2 01/23/2020    HGB 13.4 01/23/2020    HCT 41.3 01/23/2020     01/23/2020    CHOL 191 01/23/2020    TRIG 130 01/23/2020    HDL 68 01/23/2020    ALT 8 (L) 01/23/2020    AST 15 01/23/2020     01/23/2020    K 4.0 01/23/2020     01/23/2020    CREATININE 0.7 01/23/2020    BUN 13 01/23/2020    CO2 24 01/23/2020    TSH 1.230 01/23/2020       Garcia Loosen:   1. Migraine with aura and without status migrainosus, not intractable  Stable on imitrex. Refilled. - SUMAtriptan (IMITREX) 50 MG tablet; Take 1 tablet by mouth daily as needed for Migraine May repeat in 2 hours if needed. Max dose 200 mg in 24 hours. Dispense: 18 tablet; Refill: 3    Other issues followed by specialists. Return in about 6 months (around 4/11/2022) for re-check migraines. Orders Placed   No orders of the defined types were placed in this encounter. Prescriptions given/sent  Orders Placed This Encounter   Medications    SUMAtriptan (IMITREX) 50 MG tablet     Sig: Take 1 tablet by mouth daily as needed for Migraine May repeat in 2 hours if needed. Max dose 200 mg in 24 hours. Dispense:  18 tablet     Refill:  3       Patient given educational materials - see patient instructions. Discussed use, benefit, and side effects of recommended medications. All patient questions answered. Pt voiced understanding. Reviewed health maintenance.           Electronically signed by Jenni Edgar MD on 10/11/2021 at 11:45 AM

## 2021-11-02 ENCOUNTER — NURSE ONLY (OUTPATIENT)
Dept: LAB | Age: 36
End: 2021-11-02

## 2021-11-10 LAB — CYTOLOGY THIN PREP PAP: NORMAL

## 2022-01-10 ENCOUNTER — NURSE ONLY (OUTPATIENT)
Dept: LAB | Age: 37
End: 2022-01-10

## 2022-03-15 ENCOUNTER — APPOINTMENT (OUTPATIENT)
Dept: GENERAL RADIOLOGY | Age: 37
End: 2022-03-15
Payer: COMMERCIAL

## 2022-03-15 ENCOUNTER — HOSPITAL ENCOUNTER (EMERGENCY)
Age: 37
Discharge: HOME | End: 2022-03-15
Payer: COMMERCIAL

## 2022-03-15 VITALS
BODY MASS INDEX: 29.44 KG/M2 | TEMPERATURE: 98.6 F | HEART RATE: 106 BPM | SYSTOLIC BLOOD PRESSURE: 118 MMHG | RESPIRATION RATE: 14 BRPM | DIASTOLIC BLOOD PRESSURE: 74 MMHG | HEIGHT: 62 IN | WEIGHT: 160 LBS | OXYGEN SATURATION: 100 %

## 2022-03-15 DIAGNOSIS — S99.922A TOE INJURY, LEFT, INITIAL ENCOUNTER: Primary | ICD-10-CM

## 2022-03-15 PROCEDURE — 73660 X-RAY EXAM OF TOE(S): CPT

## 2022-03-15 PROCEDURE — 99213 OFFICE O/P EST LOW 20 MIN: CPT | Performed by: NURSE PRACTITIONER

## 2022-03-15 PROCEDURE — 99213 OFFICE O/P EST LOW 20 MIN: CPT

## 2022-03-15 ASSESSMENT — PAIN DESCRIPTION - DESCRIPTORS: DESCRIPTORS: THROBBING

## 2022-03-15 ASSESSMENT — PAIN DESCRIPTION - ORIENTATION: ORIENTATION: LEFT

## 2022-03-15 ASSESSMENT — PAIN DESCRIPTION - LOCATION: LOCATION: TOE (COMMENT WHICH ONE)

## 2022-03-15 ASSESSMENT — PAIN SCALES - GENERAL: PAINLEVEL_OUTOF10: 4

## 2022-03-15 ASSESSMENT — PAIN DESCRIPTION - FREQUENCY: FREQUENCY: CONTINUOUS

## 2022-03-15 ASSESSMENT — PAIN DESCRIPTION - PAIN TYPE: TYPE: ACUTE PAIN

## 2022-03-15 NOTE — ED PROVIDER NOTES
[facility]       CHIEF COMPLAINT       Chief Complaint   Patient presents with    Toe Injury     left great toe injury a week ago dropped heavy metal water bottle on it        Nurses Notes reviewed and I agree except as noted in the HPI. HISTORY OF PRESENT ILLNESS   Leno Hinton is a 40 y.o. female who presents with complaints of left great toe pain for the past week after she dropped a heavy metal water bottle. She states she continues have moderate amount of pain. There is no radiation of pain or numbness or tingling. Pain worsens with palpation or movement. She does have range of motion in her toe with discomfort. She states she has been icing and taking ibuprofen the bruising has resolved. However, the pain persist.    The history is provided by the patient. REVIEW OF SYSTEMS     Review of Systems   Constitutional: Negative for activity change. Cardiovascular: Negative for leg swelling. Musculoskeletal: Positive for arthralgias, joint swelling and myalgias. Negative for gait problem. Skin: Positive for wound. Neurological: Negative for weakness and numbness. PAST MEDICAL HISTORY         Diagnosis Date    Asthma     History of chicken pox     Low grade squamous intraepithelial lesion on cytologic smear of cervix (LGSIL)     Migraines     Numbness and tingling in left hand     Due to prior injury       SURGICALHISTORY     Patient  has a past surgical history that includes Dilation and curettage of uterus; Finger fracture surgery; and Tongue surgery. CURRENT MEDICATIONS       Previous Medications    ALBUTEROL (PROVENTIL HFA;VENTOLIN HFA) 108 (90 BASE) MCG/ACT INHALER    Inhale 2 puffs into the lungs every 6 hours as needed for Wheezing. BUDESONIDE-FORMOTEROL (SYMBICORT) 160-4.5 MCG/ACT AERO    Inhale 2 puffs into the lungs 2 times daily    HYDROCORTISONE 2.5 % CREAM    Apply topically 2 times daily.     IBUPROFEN (ADVIL;MOTRIN) menstrual period was 03/07/2022. Physical Exam  Vitals and nursing note reviewed. Constitutional:       General: She is not in acute distress. Cardiovascular:      Rate and Rhythm: Normal rate. Pulses: Normal pulses. Pulmonary:      Effort: Pulmonary effort is normal.   Musculoskeletal:         General: Swelling (left great toe), tenderness and signs of injury present. No deformity. Skin:     General: Skin is warm and dry. Findings: Bruising (left great toe) present. Neurological:      Mental Status: She is alert and oriented to person, place, and time. DIAGNOSTIC RESULTS     Labs:No results found for this visit on 03/15/22. IMAGING:    XR TOE LEFT (MIN 2 VIEWS)   Final Result   Negative left toe            **This report has been created using voice recognition software. It may contain minor errors which are inherent in voice recognition technology. **      Final report electronically signed by Dr. Humza Stewart on 3/15/2022 1:27 PM          I have independently reviewed the radiology images as well as the radiologist's report and have discussed them with the patient. EKG:  None    URGENT CARE COURSE:     Vitals:    03/15/22 1259   BP: 118/74   Pulse: 106   Resp: 14   Temp: 98.6 °F (37 °C)   TempSrc: Temporal   SpO2: 100%   Weight: 160 lb (72.6 kg)   Height: 5' 2\" (1.575 m)       Medications - No data to display       PROCEDURES:  None    FINAL IMPRESSION      1. Toe injury, left, initial encounter      DISPOSITION/ PLAN   DISPOSITION Decision To Discharge 03/15/2022 01:33:43 PM     X-ray of the left toe negative for acute fracture. Toe injury consistent with a contusion. Recommend continued over-the-counter antipyretics for discomfort, resting, icing, compressing and elevating as necessary. Follow-up with PCP if does not improve in 10 days. Patient voiced understanding was agreeable to above-mentioned plan. Patient was discharged in stable condition.     PATIENT REFERRED TO:  Claudeen Lone, MD [address] / [place] [id number]      DISCHARGE MEDICATIONS:  New Prescriptions    No medications on file       Discontinued Medications    No medications on file       Current Discharge Medication List          LALIT Vincent CNP    (Please note that portions of this note were completed with a voice recognition program. Efforts were made to edit the dictations but occasionally words are mis-transcribed.)            LALIT Vincent CNP  03/15/22 0412

## 2022-03-15 NOTE — ED TRIAGE NOTES
Pt ambulatory into esuc with c/o left great toe pain. Pt states a week ago she dropped a full metal bottle of water on it. Pt states pain has continued with a pain of 4. Small red area noted. Pt states bruising has gone away.

## 2022-04-25 ENCOUNTER — TELEMEDICINE (OUTPATIENT)
Dept: FAMILY MEDICINE CLINIC | Age: 37
End: 2022-04-25
Payer: COMMERCIAL

## 2022-04-25 DIAGNOSIS — R87.612 LOW GRADE SQUAMOUS INTRAEPITHELIAL LESION ON CYTOLOGIC SMEAR OF CERVIX (LGSIL): ICD-10-CM

## 2022-04-25 DIAGNOSIS — G43.109 MIGRAINE WITH AURA AND WITHOUT STATUS MIGRAINOSUS, NOT INTRACTABLE: ICD-10-CM

## 2022-04-25 DIAGNOSIS — J45.30 MILD PERSISTENT ASTHMA WITHOUT COMPLICATION: ICD-10-CM

## 2022-04-25 DIAGNOSIS — Z00.00 WELL WOMAN EXAM (NO GYNECOLOGICAL EXAM): Primary | ICD-10-CM

## 2022-04-25 PROBLEM — G43.909 MIGRAINES: Status: ACTIVE | Noted: 2020-01-23

## 2022-04-25 PROBLEM — J45.909 ASTHMA: Status: ACTIVE | Noted: 2022-04-25

## 2022-04-25 PROCEDURE — 99395 PREV VISIT EST AGE 18-39: CPT | Performed by: FAMILY MEDICINE

## 2022-04-25 RX ORDER — SUMATRIPTAN 50 MG/1
50 TABLET, FILM COATED ORAL DAILY PRN
Qty: 18 TABLET | Refills: 5 | Status: SHIPPED | OUTPATIENT
Start: 2022-04-25

## 2022-04-25 ASSESSMENT — ENCOUNTER SYMPTOMS
SHORTNESS OF BREATH: 0
COUGH: 0
VOMITING: 0
NAUSEA: 0

## 2022-04-25 ASSESSMENT — PATIENT HEALTH QUESTIONNAIRE - PHQ9
SUM OF ALL RESPONSES TO PHQ QUESTIONS 1-9: 0
2. FEELING DOWN, DEPRESSED OR HOPELESS: 0
SUM OF ALL RESPONSES TO PHQ9 QUESTIONS 1 & 2: 0
SUM OF ALL RESPONSES TO PHQ QUESTIONS 1-9: 0
1. LITTLE INTEREST OR PLEASURE IN DOING THINGS: 0
SUM OF ALL RESPONSES TO PHQ QUESTIONS 1-9: 0
SUM OF ALL RESPONSES TO PHQ QUESTIONS 1-9: 0

## 2022-04-25 NOTE — PROGRESS NOTES
[address] [address]  Dept: [phone]  Dept Fax: [phone]  Loc: [phone]      Sofy Sethi is a 40 y.o. female who presents todayfor her medical conditions/complaints as noted below. Sofy Sethi is c/o of [facility]ness Program, Migraine, and Asthma      :     HPI     Here for video follow-up today. Doing well. Headaches controlled. Uses Imitrex maybe once a week. Rarely takes two pills. Breathing is stable with rare use of inhaler. Does not need refills on these medications. Gets these from allergy/immunology. Does report she had colposcopy after abnormal pap smear that was normal.  Repeat is planned in 1 year. No depression. Otherwise feels well. Did get COVID-19 booster; updated chart. Will get pneumococcal vaccine once we have new vaccine in stock. Was in ER for toe pain after dropping something on it. This was not broken and has healed up. Patient Active Problem List   Diagnosis    Migraines    Mild persistent asthma without complication    Family history of bicuspid aortic valve    Numbness and tingling in left hand    Atopic eczema    Low grade squamous intraepithelial lesion on cytologic smear of cervix (LGSIL)    Asthma     Goals    None       The patient is allergic to adhesive tape. Medical History  Raman Farias has a past medical history of Asthma, History of chicken pox, Low grade squamous intraepithelial lesion on cytologic smear of cervix (LGSIL), Migraines, and Numbness and tingling in left hand. Past SurgicalHistory  The patient  has a past surgical history that includes Dilation and curettage of uterus; Finger fracture surgery; and Tongue surgery.     Family History  This patient's family history includes Anxiety Disorder in her maternal aunt, maternal uncle, and mother; Asthma in her daughter and son; High Cholesterol in her father and mother; Irritable Bowel Syndrome in her son; Other in her maternal grandmother, maternal uncle, mother, son, and son; Parkinsonism in her paternal grandfather; Prostate Cancer in her maternal grandfather; Rheum Arthritis in her sister. Social History  Elly  reports that she has never smoked. She has never used smokeless tobacco. She reports that she does not drink alcohol and does not use drugs. Medications    Current Outpatient Medications:     SUMAtriptan (IMITREX) 50 MG tablet, Take 1 tablet by mouth daily as needed for Migraine May repeat in 2 hours if needed. Max dose 200 mg in 24 hours. , Disp: 18 tablet, Rfl: 5    Triamcinolone Acetonide (NASACORT ALLERGY 24HR NA), by Nasal route, Disp: , Rfl:     budesonide-formoterol (SYMBICORT) 160-4.5 MCG/ACT AERO, Inhale 2 puffs into the lungs 2 times daily, Disp: , Rfl:     Multiple Vitamin (MULTI-VITAMIN DAILY PO), Take by mouth, Disp: , Rfl:     montelukast (SINGULAIR) 10 MG tablet, Take 10 mg by mouth daily, Disp: , Rfl:     levocetirizine (XYZAL) 5 MG tablet, Take 5 mg by mouth nightly., Disp: , Rfl:     albuterol (PROVENTIL HFA;VENTOLIN HFA) 108 (90 BASE) MCG/ACT inhaler, Inhale 2 puffs into the lungs every 6 hours as needed for Wheezing., Disp: , Rfl:     Subjective:      Review of Systems   Constitutional: Negative for chills and fever. Eyes: Negative for visual disturbance. Respiratory: Negative for cough and shortness of breath. Cardiovascular: Negative for chest pain. Gastrointestinal: Negative for nausea and vomiting. Skin: Negative for rash. Neurological: Positive for headaches (occasional). Negative for syncope, weakness and numbness. Psychiatric/Behavioral: Negative for decreased concentration and dysphoric mood. The patient is not nervous/anxious. Objective: There were no vitals filed for this visit. Physical Exam  Constitutional:       General: She is not in acute distress. Appearance: Normal appearance.  She is not ill-appearing or toxic-appearing. HENT:      Head: Normocephalic and atraumatic. Nose: Nose normal.      Mouth/Throat:      Mouth: Mucous membranes are moist.   Eyes:      Conjunctiva/sclera: Conjunctivae normal.      Pupils: Pupils are equal, round, and reactive to light. Pulmonary:      Effort: Pulmonary effort is normal. No respiratory distress. Skin:     General: Skin is dry. Findings: No rash. Neurological:      Mental Status: She is alert. Psychiatric:         Mood and Affect: Mood normal.         Behavior: Behavior normal.         Thought Content: Thought content normal.         Lab Results   Component Value Date    WBC 5.2 2020    HGB 13.4 2020    HCT 41.3 2020     2020    CHOL 191 2020    TRIG 130 2020    HDL 68 2020    ALT 8 (L) 2020    AST 15 2020     2020    K 4.0 2020     2020    CREATININE 0.7 2020    BUN 13 2020    CO2 24 2020    TSH 1.230 2020       Yamilka Cashin. Well woman exam (no gynecological exam)  Well check today. 2. Migraine with aura and without status migrainosus, not intractable  Stable. Refilled medication.    - SUMAtriptan (IMITREX) 50 MG tablet; Take 1 tablet by mouth daily as needed for Migraine May repeat in 2 hours if needed. Max dose 200 mg in 24 hours. Dispense: 18 tablet; Refill: 5    3. Mild persistent asthma without complication  Stable. 4. Low grade squamous intraepithelial lesion on cytologic smear of cervix (LGSIL)  Recent negative colposcopy reported. Has follow-up pap planned in 1 year. Return in about 1 year (around 2023) for Annual wellness visit. Orders Placed   No orders of the defined types were placed in this encounter.       Prescriptions given/sent  Orders Placed This Encounter   Medications    SUMAtriptan (IMITREX) 50 MG tablet     Sig: Take 1 tablet by mouth daily as needed for Migraine May repeat in 2 hours if needed. Max dose 200 mg in 24 hours. Dispense:  18 tablet     Refill:  5       Patient given educational materials - see patient instructions. Discussed use, benefit, and side effects of recommended medications. All patient questions answered. Pt voiced understanding. Reviewed health maintenance - will get pneumococcal vaccine in follow-up.           Electronically signed by Aruna Goodman MD on 4/25/2022 at 12:21 PM

## 2023-06-05 ENCOUNTER — NURSE ONLY (OUTPATIENT)
Dept: LAB | Age: 38
End: 2023-06-05

## 2023-06-12 LAB — CYTOLOGY THIN PREP PAP: NORMAL

## 2024-03-12 ENCOUNTER — HOSPITAL ENCOUNTER (EMERGENCY)
Age: 39
Discharge: HOME | End: 2024-03-12
Payer: COMMERCIAL

## 2024-03-12 VITALS
TEMPERATURE: 98.3 F | DIASTOLIC BLOOD PRESSURE: 57 MMHG | OXYGEN SATURATION: 100 % | HEART RATE: 96 BPM | RESPIRATION RATE: 20 BRPM | SYSTOLIC BLOOD PRESSURE: 129 MMHG

## 2024-03-12 DIAGNOSIS — J02.0 STREPTOCOCCAL SORE THROAT: Primary | ICD-10-CM

## 2024-03-12 LAB — S PYO AG THROAT QL: POSITIVE

## 2024-03-12 PROCEDURE — 99213 OFFICE O/P EST LOW 20 MIN: CPT

## 2024-03-12 PROCEDURE — 87651 STREP A DNA AMP PROBE: CPT

## 2024-03-12 RX ORDER — AMOXICILLIN 500 MG/1
500 CAPSULE ORAL 2 TIMES DAILY
Qty: 20 CAPSULE | Refills: 0 | Status: SHIPPED | OUTPATIENT
Start: 2024-03-12 | End: 2024-03-22

## 2024-03-12 ASSESSMENT — ENCOUNTER SYMPTOMS: SORE THROAT: 1

## 2024-03-12 NOTE — DISCHARGE INSTRUCTIONS
Medication as prescribed.   Warm salt water gargles and throat lozenges for sore throat.   Increase water intake, frequent hand washing.  Tylenol / Ibuprofen as needed for fever and or pain.  Follow up with PCP in 3-5 days if no improvement or sooner with worsening symptoms.

## 2024-03-12 NOTE — ED PROVIDER NOTES
instructed to use over-the-counter Tylenol and Motrin for pain or fever.  Instructed to follow-up with their PCP in 3 to 5 days and worsening symptoms.  The patient is agreeable with the above plan and denies questions or concerns at this time.        PATIENT REFERRED TO:  Alyse Skaggs MD  [address] [facility] [address]      DISCHARGE MEDICATIONS:  Discharge Medication List as of 3/12/2024  9:27 AM        START taking these medications    Details   amoxicillin (AMOXIL) 500 MG capsule Take 1 capsule by mouth 2 times daily for 10 days, Disp-20 capsule, R-0Normal             Discharge Medication List as of 3/12/2024  9:27 AM          Discharge Medication List as of 3/12/2024  9:27 AM          LALIT Pérez CNP    (Please note that portions of this note were completed with a voice recognition program. Efforts were made to edit the dictations but occasionally words are mis-transcribed.)            Elsa Painter APRN - CNP  03/12/24 [id number]

## 2025-02-05 ENCOUNTER — HOSPITAL ENCOUNTER (EMERGENCY)
Age: 40
Discharge: HOME | End: 2025-02-05
Payer: COMMERCIAL

## 2025-02-05 VITALS
HEART RATE: 120 BPM | RESPIRATION RATE: 20 BRPM | DIASTOLIC BLOOD PRESSURE: 81 MMHG | OXYGEN SATURATION: 97 % | SYSTOLIC BLOOD PRESSURE: 124 MMHG | TEMPERATURE: 98.6 F

## 2025-02-05 DIAGNOSIS — R68.89 INFLUENZA-LIKE SYMPTOMS: Primary | ICD-10-CM

## 2025-02-05 DIAGNOSIS — Z20.828 EXPOSURE TO INFLUENZA: ICD-10-CM

## 2025-02-05 PROCEDURE — 99213 OFFICE O/P EST LOW 20 MIN: CPT | Performed by: NURSE PRACTITIONER

## 2025-02-05 PROCEDURE — 99213 OFFICE O/P EST LOW 20 MIN: CPT

## 2025-02-05 RX ORDER — OSELTAMIVIR PHOSPHATE 75 MG/1
75 CAPSULE ORAL 2 TIMES DAILY
Qty: 20 CAPSULE | Refills: 0 | Status: SHIPPED | OUTPATIENT
Start: 2025-02-05 | End: 2025-02-15

## 2025-02-05 ASSESSMENT — ENCOUNTER SYMPTOMS
COUGH: 1
FLU SYMPTOMS: 1

## 2025-02-05 NOTE — DISCHARGE INSTRUCTIONS
You will need to follow with your family provider if more then 2 calendar days in a row are needed off for illness from work or if FLMA paperwork is required.

## 2025-02-05 NOTE — ED PROVIDER NOTES
[facility]  UrgentCare Encounter      CHIEFCOMPLAINT       Chief Complaint   Patient presents with    Generalized Body Aches    Cough       Nurses Notes reviewed and I agree except as noted in the HPI.  HISTORY OF PRESENT ILLNESS     Elly Brooks is a 40 y.o. female who presents to the urgent care for evaluation.  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 She is requesting Tamiflu today due to her history of asthma.  The history is provided by the patient.   Influenza  Presenting symptoms: cough and myalgias    Duration:  1 day  Risk factors: sick contacts (influenza)    Risk factors comment:  Asthma      The patient/patient representative has no other acute complaints at this time.    REVIEW OF SYSTEMS     Review of Systems   Respiratory:  Positive for cough.    Musculoskeletal:  Positive for myalgias.   All other systems reviewed and are negative.      PAST MEDICAL HISTORY         Diagnosis Date    Asthma     History of chicken pox     Low grade squamous intraepithelial lesion on cytologic smear of cervix (LGSIL)     Migraines     Numbness and tingling in left hand     Due to prior injury       SURGICAL HISTORY     Patient  has a past surgical history that includes Dilation and curettage of uterus; Finger fracture surgery; and Tongue surgery.    CURRENT MEDICATIONS       Discharge Medication List as of 2/5/2025  4:21 PM        CONTINUE these medications which have NOT CHANGED    Details   SUMAtriptan (IMITREX) 50 MG tablet Take 1 tablet by mouth daily as needed for Migraine May repeat in 2 hours if needed.  Max dose 200 mg in 24 hours., Disp-18 tablet, R-5Normal      Triamcinolone Acetonide (NASACORT ALLERGY 24HR NA) by Nasal routeHistorical Med      budesonide-formoterol (SYMBICORT) 160-4.5 MCG/ACT AERO Inhale 2 puffs into the lungs 2 times dailyHistorical Med      Multiple Vitamin (MULTI-VITAMIN DAILY PO) Take by mouthHistorical  TO:  Alyse Skaggs MD  [address] [facility] [address]  [phone]    Schedule an appointment as soon as possible for a visit in 3 days  For further evaluation., If symptoms change/worsen, go to the ER      LALIT Black CNP    Please note that some or all of this chart was generated using [employer] voice recognition software. Although every effort was made to ensure the accuracy of this automated transcription, some errors in transcription may have occurred.         Delores Limon APRN - CNP  02/05/25 1065